# Patient Record
Sex: FEMALE | Race: BLACK OR AFRICAN AMERICAN | NOT HISPANIC OR LATINO | Employment: FULL TIME | URBAN - METROPOLITAN AREA
[De-identification: names, ages, dates, MRNs, and addresses within clinical notes are randomized per-mention and may not be internally consistent; named-entity substitution may affect disease eponyms.]

---

## 2021-05-14 PROBLEM — E66.01 SEVERE OBESITY (BMI >= 40): Status: ACTIVE | Noted: 2021-05-14

## 2021-05-14 PROBLEM — I10 ESSENTIAL HYPERTENSION: Status: ACTIVE | Noted: 2021-05-14

## 2021-05-14 PROBLEM — I63.9 CEREBROVASCULAR ACCIDENT (CVA): Status: ACTIVE | Noted: 2021-05-14

## 2021-05-15 PROBLEM — E11.9 TYPE 2 DIABETES MELLITUS WITHOUT COMPLICATION, WITHOUT LONG-TERM CURRENT USE OF INSULIN: Status: ACTIVE | Noted: 2021-05-15

## 2021-05-15 PROBLEM — G43.009 ATYPICAL MIGRAINE: Status: ACTIVE | Noted: 2021-05-15

## 2021-05-15 PROBLEM — G47.34 NOCTURNAL HYPOXEMIA: Status: ACTIVE | Noted: 2021-05-15

## 2021-05-15 PROBLEM — R53.1 LEFT-SIDED WEAKNESS: Status: ACTIVE | Noted: 2021-05-14

## 2021-05-15 PROBLEM — Z92.82 STATUS POST ADMINISTRATION OF TPA (RTPA) IN A DIFFERENT FACILITY WITHIN THE LAST 24 HOURS PRIOR TO ADMISSION TO CURRENT FACILITY: Status: ACTIVE | Noted: 2021-05-15

## 2021-05-15 PROBLEM — R56.9 NEW ONSET SEIZURE: Status: ACTIVE | Noted: 2021-05-15

## 2021-05-16 PROBLEM — G83.84 POST-ICTAL HEMIPLEGIA: Status: ACTIVE | Noted: 2021-05-14

## 2021-05-18 ENCOUNTER — TELEPHONE (OUTPATIENT)
Dept: NEUROLOGY | Facility: CLINIC | Age: 36
End: 2021-05-18

## 2021-06-03 ENCOUNTER — OFFICE VISIT (OUTPATIENT)
Dept: NEUROLOGY | Facility: CLINIC | Age: 36
End: 2021-06-03
Payer: MEDICAID

## 2021-06-03 VITALS
WEIGHT: 260.81 LBS | HEIGHT: 63 IN | HEART RATE: 79 BPM | SYSTOLIC BLOOD PRESSURE: 138 MMHG | DIASTOLIC BLOOD PRESSURE: 90 MMHG | BODY MASS INDEX: 46.21 KG/M2 | TEMPERATURE: 97 F

## 2021-06-03 DIAGNOSIS — I10 ESSENTIAL HYPERTENSION: ICD-10-CM

## 2021-06-03 DIAGNOSIS — E66.01 SEVERE OBESITY (BMI >= 40): ICD-10-CM

## 2021-06-03 DIAGNOSIS — R56.9 NEW ONSET SEIZURE: ICD-10-CM

## 2021-06-03 DIAGNOSIS — G83.84: ICD-10-CM

## 2021-06-03 PROCEDURE — 99999 PR PBB SHADOW E&M-EST. PATIENT-LVL III: ICD-10-PCS | Mod: PBBFAC,,, | Performed by: NURSE PRACTITIONER

## 2021-06-03 PROCEDURE — 99215 PR OFFICE/OUTPT VISIT, EST, LEVL V, 40-54 MIN: ICD-10-PCS | Mod: S$PBB,,, | Performed by: NURSE PRACTITIONER

## 2021-06-03 PROCEDURE — 99999 PR PBB SHADOW E&M-EST. PATIENT-LVL III: CPT | Mod: PBBFAC,,, | Performed by: NURSE PRACTITIONER

## 2021-06-03 PROCEDURE — 99213 OFFICE O/P EST LOW 20 MIN: CPT | Mod: PBBFAC,PN | Performed by: NURSE PRACTITIONER

## 2021-06-03 PROCEDURE — 99215 OFFICE O/P EST HI 40 MIN: CPT | Mod: S$PBB,,, | Performed by: NURSE PRACTITIONER

## 2021-06-03 RX ORDER — AMOXICILLIN AND CLAVULANATE POTASSIUM 875; 125 MG/1; MG/1
1 TABLET, FILM COATED ORAL 2 TIMES DAILY
COMMUNITY
Start: 2021-05-31 | End: 2022-03-09 | Stop reason: ALTCHOICE

## 2021-06-03 RX ORDER — ONDANSETRON 4 MG/1
TABLET, ORALLY DISINTEGRATING ORAL
COMMUNITY
Start: 2021-05-31

## 2021-07-07 DIAGNOSIS — R56.9 NEW ONSET SEIZURE: Primary | ICD-10-CM

## 2021-07-07 RX ORDER — TOPIRAMATE 50 MG/1
50 TABLET, FILM COATED ORAL 2 TIMES DAILY
Qty: 60 TABLET | Refills: 11 | Status: SHIPPED | OUTPATIENT
Start: 2021-07-07 | End: 2022-07-07

## 2021-07-20 ENCOUNTER — TELEPHONE (OUTPATIENT)
Dept: NEUROLOGY | Facility: CLINIC | Age: 36
End: 2021-07-20

## 2021-11-08 ENCOUNTER — TELEPHONE (OUTPATIENT)
Dept: NEUROLOGY | Facility: CLINIC | Age: 36
End: 2021-11-08
Payer: MEDICAID

## 2021-12-03 ENCOUNTER — TELEPHONE (OUTPATIENT)
Dept: NEUROLOGY | Facility: CLINIC | Age: 36
End: 2021-12-03
Payer: MEDICAID

## 2021-12-06 ENCOUNTER — OFFICE VISIT (OUTPATIENT)
Dept: NEUROLOGY | Facility: CLINIC | Age: 36
End: 2021-12-06
Payer: MEDICAID

## 2021-12-06 VITALS
SYSTOLIC BLOOD PRESSURE: 131 MMHG | HEART RATE: 84 BPM | DIASTOLIC BLOOD PRESSURE: 88 MMHG | RESPIRATION RATE: 16 BRPM | TEMPERATURE: 98 F | BODY MASS INDEX: 38.99 KG/M2 | WEIGHT: 220.13 LBS

## 2021-12-06 DIAGNOSIS — R56.9 SEIZURE: Primary | ICD-10-CM

## 2021-12-06 DIAGNOSIS — E66.9 OBESITY (BMI 30-39.9): ICD-10-CM

## 2021-12-06 DIAGNOSIS — G43.009 ATYPICAL MIGRAINE: ICD-10-CM

## 2021-12-06 DIAGNOSIS — E11.9 TYPE 2 DIABETES MELLITUS WITHOUT COMPLICATION, WITHOUT LONG-TERM CURRENT USE OF INSULIN: ICD-10-CM

## 2021-12-06 DIAGNOSIS — I10 ESSENTIAL HYPERTENSION: ICD-10-CM

## 2021-12-06 PROBLEM — G83.84 POST-ICTAL HEMIPLEGIA: Status: RESOLVED | Noted: 2021-05-14 | Resolved: 2021-12-06

## 2021-12-06 PROCEDURE — 99999 PR PBB SHADOW E&M-EST. PATIENT-LVL IV: ICD-10-PCS | Mod: PBBFAC,,, | Performed by: NURSE PRACTITIONER

## 2021-12-06 PROCEDURE — 99999 PR PBB SHADOW E&M-EST. PATIENT-LVL IV: CPT | Mod: PBBFAC,,, | Performed by: NURSE PRACTITIONER

## 2021-12-06 PROCEDURE — 99214 PR OFFICE/OUTPT VISIT, EST, LEVL IV, 30-39 MIN: ICD-10-PCS | Mod: S$PBB,,, | Performed by: NURSE PRACTITIONER

## 2021-12-06 PROCEDURE — 99214 OFFICE O/P EST MOD 30 MIN: CPT | Mod: PBBFAC,PO | Performed by: NURSE PRACTITIONER

## 2021-12-06 PROCEDURE — 99214 OFFICE O/P EST MOD 30 MIN: CPT | Mod: S$PBB,,, | Performed by: NURSE PRACTITIONER

## 2021-12-06 RX ORDER — LANOLIN ALCOHOL/MO/W.PET/CERES
400 CREAM (GRAM) TOPICAL DAILY
COMMUNITY

## 2022-02-25 ENCOUNTER — TELEPHONE (OUTPATIENT)
Dept: NEUROLOGY | Facility: CLINIC | Age: 37
End: 2022-02-25
Payer: MEDICAID

## 2022-02-25 NOTE — TELEPHONE ENCOUNTER
Call returned to Pt in ref to medication.Pt states she would like to lower the dose of her Topamax in order to wean off of it. C/O of it causing headaches back to back. Message forwarded to ARIAS Jaeger to advise.

## 2022-02-25 NOTE — TELEPHONE ENCOUNTER
Call placed to Pt to advise that per LENKA Ward, D/t having been on topiramate for a while, she feels It is very unlikely that this is what is causing her to have a headache. Pt asked if she would  like to be seen in migraine clinic given her history of migraines and worsening? Pt states that she would like to be seen.

## 2022-02-25 NOTE — TELEPHONE ENCOUNTER
----- Message from Padmini Valdez sent at 2/25/2022  9:16 AM CST -----  Contact: patient  Type: Needs Medical Advice  Who Called:  patient   Symptoms (please be specific):  headaches back to back  How long has patient had these symptoms:    Pharmacy name and phone #:    Best Call Back Number: [phone] (home)     Additional Information: requesting a return call concerning clearing patient off of medication.

## 2022-03-09 ENCOUNTER — OFFICE VISIT (OUTPATIENT)
Dept: NEUROLOGY | Facility: CLINIC | Age: 37
End: 2022-03-09
Payer: MEDICAID

## 2022-03-09 VITALS
SYSTOLIC BLOOD PRESSURE: 129 MMHG | DIASTOLIC BLOOD PRESSURE: 83 MMHG | RESPIRATION RATE: 17 BRPM | HEART RATE: 74 BPM | BODY MASS INDEX: 37.64 KG/M2 | HEIGHT: 63 IN | WEIGHT: 212.44 LBS | TEMPERATURE: 97 F

## 2022-03-09 DIAGNOSIS — M79.18 MYOFASCIAL PAIN: ICD-10-CM

## 2022-03-09 DIAGNOSIS — G44.86 CERVICOGENIC HEADACHE: Primary | ICD-10-CM

## 2022-03-09 DIAGNOSIS — G47.26 SHIFT WORK SLEEP DISORDER: ICD-10-CM

## 2022-03-09 DIAGNOSIS — G43.009 MIGRAINE WITHOUT AURA AND WITHOUT STATUS MIGRAINOSUS, NOT INTRACTABLE: ICD-10-CM

## 2022-03-09 PROCEDURE — 1159F MED LIST DOCD IN RCRD: CPT | Mod: CPTII,,, | Performed by: PHYSICIAN ASSISTANT

## 2022-03-09 PROCEDURE — 99999 PR PBB SHADOW E&M-EST. PATIENT-LVL V: CPT | Mod: PBBFAC,,, | Performed by: PHYSICIAN ASSISTANT

## 2022-03-09 PROCEDURE — 3079F PR MOST RECENT DIASTOLIC BLOOD PRESSURE 80-89 MM HG: ICD-10-PCS | Mod: CPTII,,, | Performed by: PHYSICIAN ASSISTANT

## 2022-03-09 PROCEDURE — 3079F DIAST BP 80-89 MM HG: CPT | Mod: CPTII,,, | Performed by: PHYSICIAN ASSISTANT

## 2022-03-09 PROCEDURE — 3074F SYST BP LT 130 MM HG: CPT | Mod: CPTII,,, | Performed by: PHYSICIAN ASSISTANT

## 2022-03-09 PROCEDURE — 1160F RVW MEDS BY RX/DR IN RCRD: CPT | Mod: CPTII,,, | Performed by: PHYSICIAN ASSISTANT

## 2022-03-09 PROCEDURE — 99215 PR OFFICE/OUTPT VISIT, EST, LEVL V, 40-54 MIN: ICD-10-PCS | Mod: S$PBB,,, | Performed by: PHYSICIAN ASSISTANT

## 2022-03-09 PROCEDURE — 99215 OFFICE O/P EST HI 40 MIN: CPT | Mod: S$PBB,,, | Performed by: PHYSICIAN ASSISTANT

## 2022-03-09 PROCEDURE — 3008F BODY MASS INDEX DOCD: CPT | Mod: CPTII,,, | Performed by: PHYSICIAN ASSISTANT

## 2022-03-09 PROCEDURE — 3008F PR BODY MASS INDEX (BMI) DOCUMENTED: ICD-10-PCS | Mod: CPTII,,, | Performed by: PHYSICIAN ASSISTANT

## 2022-03-09 PROCEDURE — 1159F PR MEDICATION LIST DOCUMENTED IN MEDICAL RECORD: ICD-10-PCS | Mod: CPTII,,, | Performed by: PHYSICIAN ASSISTANT

## 2022-03-09 PROCEDURE — 99999 PR PBB SHADOW E&M-EST. PATIENT-LVL V: ICD-10-PCS | Mod: PBBFAC,,, | Performed by: PHYSICIAN ASSISTANT

## 2022-03-09 PROCEDURE — 99215 OFFICE O/P EST HI 40 MIN: CPT | Mod: PBBFAC,PO | Performed by: PHYSICIAN ASSISTANT

## 2022-03-09 PROCEDURE — 1160F PR REVIEW ALL MEDS BY PRESCRIBER/CLIN PHARMACIST DOCUMENTED: ICD-10-PCS | Mod: CPTII,,, | Performed by: PHYSICIAN ASSISTANT

## 2022-03-09 PROCEDURE — 3074F PR MOST RECENT SYSTOLIC BLOOD PRESSURE < 130 MM HG: ICD-10-PCS | Mod: CPTII,,, | Performed by: PHYSICIAN ASSISTANT

## 2022-03-09 RX ORDER — ASCORBIC ACID, CHOLECALCIFEROL, .ALPHA.-TOCOPHEROL ACETATE, DL-, PYRIDOXINE HYDROCHLORIDE, FOLIC ACID, CYANOCOBALAMIN, BIOTIN, CALCIUM CARBONATE, FERROUS ASPARTO GLYCINATE, IRON, POTASSIUM IODIDE, MAGNESIUM OXIDE, DOCONEXENT AND LOWBUSH BLUEBERRY 60; 1000; 10; 26; 400; 13; 280; 80; 9; 9; 150; 25; 350; 25; 600 MG/1; [IU]/1; [IU]/1; MG/1; UG/1; UG/1; UG/1; MG/1; MG/1; MG/1; UG/1; MG/1; MG/1; MG/1; UG/1
1 CAPSULE, GELATIN COATED ORAL DAILY
COMMUNITY
Start: 2021-12-15

## 2022-03-09 RX ORDER — RIZATRIPTAN BENZOATE 10 MG/1
TABLET, ORALLY DISINTEGRATING ORAL
Qty: 9 TABLET | Refills: 4 | Status: SHIPPED | OUTPATIENT
Start: 2022-03-09

## 2022-03-09 RX ORDER — CHLORZOXAZONE 500 MG/1
500 TABLET ORAL 3 TIMES DAILY PRN
Qty: 90 TABLET | Refills: 4 | Status: SHIPPED | OUTPATIENT
Start: 2022-03-09 | End: 2022-03-19

## 2022-03-09 NOTE — PROGRESS NOTES
"  [facility]  [address] [facility] [address]  [place] [address]  Phone:[phone]  Fax: [phone]   New Patient Consultation (though seen by ARIAS Thomason in past)    Patient Name: Dary Pereira  : 1985  MRN:  [id number]  Today: 3/9/2022   chief complaint: Headache    PCP: PAT Leggett.    Assessment:   Dary Pereira is a 36 y.o. right handed, female with a PMHx of: migraine (at times "complicated migraine"), seizure (negative EEG findings), HTN and DM2  whom presents solo to discuss HA. HA appear to be migrainous with cervicogenic/myofascial component. Lack of sleep (apparent shift work disorder, as she states "I was better when I worked days") possibly contribute.       Review:    ICD-10-CM ICD-9-CM   1. Cervicogenic headache  G44.86 784.0   2. Migraine without aura and without status migrainosus, not intractable  G43.009 346.10   3. Shift work sleep disorder  G47.26 327.36   4. Myofascial pain  M79.18 729.1         Plan:   Discussed realistic goals of care with patient at length. Discussed medication options, need for lifestyle adjustment. Discussed treatment will take time. Goal will be to reduce frequency/intensity/quantity of HA, not to be completely HA free. Gave copy of S triggers for migraine informational sheet, and discussed clinic's non narcotic policy re: HA. Patient voiced understanding and agreement.            -will have patient work on lifestyle           -if HA change in quality/nature, will get updated imaging studies             -discussed potential for teratogenicity with treatment, if her family planning status should change, discussed I'd like her to let office know immediately. She voice agreement    For HA Prevention:  1 did not want to adjust topamax, also states she doesn't want any medicine that will make her tired in case she needs to be up during the day  2 difficult for her to get PT d/t sleeping during " "day   3 try parafon forte (non sedating muscle relaxant) 1 pill TID for 2-3 weeks, then switch to 1 pill TID PRN HA/myofascial pain, no use with etoh, if it makes her tired/just cut in half, she agreed to try     For HA :  1 wrote for maxalt MLT, discussed adv effects/dosing, she agreed         -we did discuss about potential concerns with stroke with this medicine, she voiced agreement and willingness to try       To break up Headaches:  We can consider nerve blocks in future     Other:  Talk to PCP about stress/possibly consider mood medication           All test results as well as any necessary instructions were reviewed and discussed with patient.    Review:  Orders Placed This Encounter    chlorzoxazone (PARAFON FORTE) 500 mg Tab    rizatriptan (MAXALT-MLT) 10 MG disintegrating tablet         Patient to return to PCP/other specialists for all other problems  Patient to continue on all medications as Rx'd   A detailed AVS was provided to the patient with patient readback   RTO- 6 weeks to check in   The patient indicates understanding of these issues and agrees to the plan.    HPI:   Dary Pereira is a 36 y.o.right handed, female with a PMHx of: migraine (at times "complicated migraine"), seizure (negative EEG findings), HTN and DM2  whom presents solo to discuss HA.        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    Tried:OTC medicines, on topamax for years at 50 mg BID   Triggers (in bold): stress, lack of sleep (sleeps during day, but this is interrupted at times), too much caffeine, too little caffeine, weather change, seasonal change, strong odours, bright lights, sunlight, food   Currently having a HA?:yes   Positives in bold: Hx of Kidney Stones, asthma, GI bleed, osteoporosis, CAD/MI, CVA/TIA, DM    Imaging on file: MRI Brain, MRA head/Neck (all 2021): unremarkable for acute/chornic concern  Therapies tried in past: (failures to be marked, if known---why did it fail?)  norvasc  topamax  MgOx  PT  zofran     Medication Reconciliation:   Current Outpatient Medications   Medication Sig Dispense Refill    amLODIPine (NORVASC) 10 MG tablet Take 1 tablet (10 mg total) by mouth once daily. 90 tablet 0    amoxicillin-clavulanate 875-125mg (AUGMENTIN) 875-125 mg per tablet Take 1 tablet by mouth 2 (two) times daily.      blood sugar diagnostic Strp To check BG 1-2 times daily, to use with insurance preferred meter 50 each 1    blood-glucose meter kit To check BG 1-2 times daily, to use with insurance preferred meter 1 each 1    lancets Misc To check BG 1-2 times daily, to use with insurance preferred meter 50 each 1    levonorgestreL (MIRENA) 20 mcg/24 hours (6 yrs) 52 mg IUD 1 each by Intrauterine route once.      magnesium oxide (MAG-OX) 400 mg (241.3 mg magnesium) tablet Take 400 mg by mouth once daily. At Hs      metFORMIN (GLUCOPHAGE) 500 MG tablet Take 1 tablet (500 mg total) by mouth 2 (two) times daily with meals. 180 tablet 0    ondansetron (ZOFRAN-ODT) 4 MG TbDL       topiramate (TOPAMAX) 50 MG tablet Take 1 tablet (50 mg total) by mouth 2 (two) times daily. 60 tablet 11     No current facility-administered medications for this visit.     Review of patient's allergies indicates:  No Known  Allergies    PMHx:  Past Medical History:   Diagnosis Date    Bronchitis     Hypertension     Migraine headache     Post-ictal hemiplegia 2021     Past Surgical History:   Procedure Laterality Date    LOOP ELECTROSURGICAL EXCISION PROCEDURE (LEEP)         Fhx:maternal aunt with headaches   Family History   Problem Relation Age of Onset    Migraines Maternal Aunt        Shx:   Social History     Socioeconomic History    Marital status: Single   Tobacco Use    Smoking status: Never Smoker    Smokeless tobacco: Never Used   Substance and Sexual Activity    Alcohol use: Yes     Comment: occas    Drug use: No    Sexual activity: Yes     Birth control/protection: I.U.D.           Labs:   Reviewed in chart     Imagin2021 MRI Brain (report): FINDINGS:  INTRACRANIAL: Brain parenchyma demonstrates normal signal and configuration.  Left basal ganglia prominent perivascular space is noted.  No parenchymal restricted diffusion.  No evidence of intracranial hemorrhage.  No extra-axial fluid collection or mass.  No intracranial mass effect.  No hydrocephalus.  Midline structures have a normal configuration.  Visualized pituitary gland and infundibulum are normal.  Visualized major intracranial vascular structures demonstrate normal flow voids and are normal in course and caliber.     SINUSES: Paranasal sinuses and mastoid air cells are clear.     ORBITS: Visualized orbits are normal.     OTHER: Moderate uniform enlargement of the posterior nasopharyngeal tissue/adenoids.     Impression:     No acute intracranial abnormality    2021 MRA brain (report):    No occlusion, hemodynamically significant stenosis or aneurysm.       5/15/2021 MRA Neck (report): Impression:     Nighthawk concordant.  No occlusion, hemodynamically significant stenosis or dissection.       Other testin/15/2021 EEG (report): This is a normal EEG in an awake and drowsy adult.  Please be aware that a normal EEG does not  "exclude the possibility of an underlying seizure disorder.     Note: I have independently reviewed any/all imaging/labs/tests and agree with the report (s) as documented.  Any discrepancies will be as noted/demarcated by free text.  RODNEY ROSE 3/9/2022                     ROS:   Review Of Systems (questions asked, positive or additions in BOLD)  Gen: Weight change, fatigue/malaise, pyrexia   HEENT: Tinnitus, headache,  blurred vision, eye pain, diplopia, photophobia,  nose bleeds, congestion, sore throat, jaw pain, scalp pain, neck stiffness   Card: Palpitations, CP   Pulm: SOB, cough   Vas: Easy bruising, easy bleeding   GI: N/V/D/C, incontinence, hematemesis, hematochezia    : incontinence, hematuria   Endocrine: Temp intolerance, polyuria, polydipsia   M/S: Neck pain, myalgia, back pain, joint pain, falls    Neuro: PER HPI   PSY: Memory loss, confusion, depression, anxiety, trouble in sleep          EXAM:   /83 (BP Location: Right arm, Patient Position: Sitting, BP Method: Large (Automatic))   Pulse 74   Temp 97.1 °F (36.2 °C) (Temporal)   Resp 17   Ht 5' 3" (1.6 m)   Wt 96.3 kg (212 lb 6.6 oz)   BMI 37.63 kg/m²    GEN:  NAD, though hand wringing witnessed,   HEENT: NC/AT, Frontalis was NTTP, temporalis was NTTP,  nares patent, dentition appropriate,   neck supple, trachea midline, Occiput and trapezius  TTP  EXTREM:  no edema present.    NEURO:  Mental Status:  Awake, alert and appropriately oriented to time, place, and person.  Normal attention and concentration.  Speech is fluent and appropriate language function (I.e., comprehension)    Cranial Nerves:    Extraocular movements are intact and without nystagmus.  Visual fields are full to confrontation testing. Colour vision change not found.  Facial movement is symmetric.  Facial sensation is intact.  Hearing is normal. Uvula in midline. DROM of neck in all (6) directions, shoulder shrug symmetrical. Tongue in midline without " fasiculation.     Motor:  RUE:appropriate against gravity and medium force as tested 5/5              LUE: appropriate against gravity and medium force as tested 5/5              RLE:appropriate against gravity and medium force as tested 5/5              LLE: appropriate against gravity and medium force as tested 5/5  Tremor/pronator drift not apparent.     finger extension strength was strong bilat     Sensory:  RUE  intact light touch, proprioception and temperature  LUE intact light touch, proprioception and temperature    RLE intact light touch  LLE intact light touch      DTR's:                                            R              L  biceps 2+ 2+         brachioradialis 2+ 2+   Knee jerk 2+ 2+      Coordination:  FTN-WNL.       Gait and Stance: Normal manner of stance and gait function testing. Romberg was negative          This document has been electronically signed by Mr. Pk Lopez MPA, PA-C on 3/9/2022, I have personally typed this message using the EMR.       Dr Randall MD  was available during today's visit.     Personal Protective Equipment:    Personal Protective Equipment was used during this encounter including: KN95 and non latex gloves.          CC: PAT Leggett, ARIAS Thomason

## 2022-03-09 NOTE — PATIENT INSTRUCTIONS
To reduce headaches:        1) continue your topamax at 50 mg twice a day        2) try the parafon forte (choloroxazone) 1 pill up to 3x a day as needed for neck pain and headaches, try daily for a few weeks then switch to as needed         3) we can consider PT in the future      To abort a bad headache:  Try the maxalt (rizatriptan) melt, 1 at onset headache, second melt 2 hours later if still needed, no more than 2 in a day, 3 days use in week

## 2022-04-19 ENCOUNTER — TELEPHONE (OUTPATIENT)
Dept: NEUROLOGY | Facility: CLINIC | Age: 37
End: 2022-04-19
Payer: MEDICAID

## 2022-04-20 ENCOUNTER — OFFICE VISIT (OUTPATIENT)
Dept: NEUROLOGY | Facility: CLINIC | Age: 37
End: 2022-04-20
Payer: MEDICAID

## 2022-04-20 VITALS
RESPIRATION RATE: 17 BRPM | HEART RATE: 80 BPM | SYSTOLIC BLOOD PRESSURE: 148 MMHG | TEMPERATURE: 99 F | WEIGHT: 210.19 LBS | DIASTOLIC BLOOD PRESSURE: 88 MMHG | HEIGHT: 63 IN | BODY MASS INDEX: 37.24 KG/M2

## 2022-04-20 DIAGNOSIS — G44.86 CERVICOGENIC HEADACHE: Primary | ICD-10-CM

## 2022-04-20 PROCEDURE — 3077F SYST BP >= 140 MM HG: CPT | Mod: CPTII,,, | Performed by: PHYSICIAN ASSISTANT

## 2022-04-20 PROCEDURE — 99214 OFFICE O/P EST MOD 30 MIN: CPT | Mod: PBBFAC,PO | Performed by: PHYSICIAN ASSISTANT

## 2022-04-20 PROCEDURE — 3079F PR MOST RECENT DIASTOLIC BLOOD PRESSURE 80-89 MM HG: ICD-10-PCS | Mod: CPTII,,, | Performed by: PHYSICIAN ASSISTANT

## 2022-04-20 PROCEDURE — 3008F PR BODY MASS INDEX (BMI) DOCUMENTED: ICD-10-PCS | Mod: CPTII,,, | Performed by: PHYSICIAN ASSISTANT

## 2022-04-20 PROCEDURE — 99214 PR OFFICE/OUTPT VISIT, EST, LEVL IV, 30-39 MIN: ICD-10-PCS | Mod: S$PBB,,, | Performed by: PHYSICIAN ASSISTANT

## 2022-04-20 PROCEDURE — 3079F DIAST BP 80-89 MM HG: CPT | Mod: CPTII,,, | Performed by: PHYSICIAN ASSISTANT

## 2022-04-20 PROCEDURE — 1159F MED LIST DOCD IN RCRD: CPT | Mod: CPTII,,, | Performed by: PHYSICIAN ASSISTANT

## 2022-04-20 PROCEDURE — 1160F RVW MEDS BY RX/DR IN RCRD: CPT | Mod: CPTII,,, | Performed by: PHYSICIAN ASSISTANT

## 2022-04-20 PROCEDURE — 99999 PR PBB SHADOW E&M-EST. PATIENT-LVL IV: ICD-10-PCS | Mod: PBBFAC,,, | Performed by: PHYSICIAN ASSISTANT

## 2022-04-20 PROCEDURE — 99214 OFFICE O/P EST MOD 30 MIN: CPT | Mod: S$PBB,,, | Performed by: PHYSICIAN ASSISTANT

## 2022-04-20 PROCEDURE — 3008F BODY MASS INDEX DOCD: CPT | Mod: CPTII,,, | Performed by: PHYSICIAN ASSISTANT

## 2022-04-20 PROCEDURE — 99999 PR PBB SHADOW E&M-EST. PATIENT-LVL IV: CPT | Mod: PBBFAC,,, | Performed by: PHYSICIAN ASSISTANT

## 2022-04-20 PROCEDURE — 1160F PR REVIEW ALL MEDS BY PRESCRIBER/CLIN PHARMACIST DOCUMENTED: ICD-10-PCS | Mod: CPTII,,, | Performed by: PHYSICIAN ASSISTANT

## 2022-04-20 PROCEDURE — 3077F PR MOST RECENT SYSTOLIC BLOOD PRESSURE >= 140 MM HG: ICD-10-PCS | Mod: CPTII,,, | Performed by: PHYSICIAN ASSISTANT

## 2022-04-20 PROCEDURE — 1159F PR MEDICATION LIST DOCUMENTED IN MEDICAL RECORD: ICD-10-PCS | Mod: CPTII,,, | Performed by: PHYSICIAN ASSISTANT

## 2022-04-20 NOTE — PROGRESS NOTES
"  [facility]  [facility]  [address]  [place] [address]  Phone:[phone]  Fax: [phone]  Follow up visit   Patient Name: Dary Pereira  : 1985  MRN:  [id number]   LOV: 3/9/2022  Today: 2022   chief complaint: Headache    PCP: PAT Leggett.    Assessment:   Dary Pereira is a 36 y.o. right handed, female with a PMHx of: migraine (at times "complicated migraine"), seizure (negative EEG findings), HTN and DM2  whom presents with fiance to discuss HA. HA appear to be migrainous with cervicogenic/myofascial component (histroically) however notes complete resolve.     Review:    ICD-10-CM ICD-9-CM   1. Cervicogenic headache  G44.86 784.0         Plan:               -if HA change in quality/nature, will get updated imaging studies            For HA Prevention:  1 continue topamax, on for "seizure" and HA, will need follow up with ARIAS Thomason for seizures later this year   2 has parafon forte available     For HA :  maxalt available     To break up Headaches:  We can consider nerve blocks in future     Other:  N/a        All test results as well as any necessary instructions were reviewed and discussed with patient.    Review:         Patient to return to PCP/other specialists for all other problems  Patient to continue on all medications as Rx'd     RTO- PRN with me   The patient indicates understanding of these issues and agrees to the plan.    HPI:   Dary Pereira is a 36 y.o.right handed, female with a PMHx of: migraine (at times "complicated migraine"), seizure (negative EEG findings), HTN and DM2  whom presents with her fiancein follow up for HA.     At last visit: parafon forte written, and maxalt ordered.   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    Tried:OTC medicines, on topamax for years at 50 mg BID   Triggers (in bold): stress, lack of sleep (sleeps during day, but this is interrupted at times), too much caffeine, too little caffeine, weather change, seasonal change, strong odours, bright lights, sunlight, food   Currently having a HA?:yes   Positives in bold: Hx of Kidney Stones, asthma, GI bleed, osteoporosis, CAD/MI, CVA/TIA, DM    Imaging on file: MRI Brain, MRA head/Neck (all 2021): unremarkable for acute/chornic concern  Therapies tried in past: (failures to be marked, if known---why did it fail?)  norvasc  topamax  MgOx  PT  zofran     Medication Reconciliation:   Current Outpatient Medications   Medication Sig Dispense Refill    amLODIPine (NORVASC) 10 MG tablet Take 1 tablet (10 mg total) by mouth once daily. 90 tablet 0    blood sugar diagnostic Strp To check BG 1-2 times daily, to use with insurance preferred meter 50 each 1    blood-glucose meter kit To check BG 1-2 times daily, to use with insurance preferred meter 1 each 1    lancets Misc To check BG 1-2 times daily, to use with insurance preferred meter 50 each 1    levonorgestreL (MIRENA) 20 mcg/24 hours (6 yrs) 52 mg IUD 1 each " by Intrauterine route once.      magnesium oxide (MAG-OX) 400 mg (241.3 mg magnesium) tablet Take 400 mg by mouth once daily. At Hs      metFORMIN (GLUCOPHAGE) 500 MG tablet Take 1 tablet (500 mg total) by mouth 2 (two) times daily with meals. 180 tablet 0    ondansetron (ZOFRAN-ODT) 4 MG TbDL       PRENATE MINI, FERR ASP GLYCIN, 18-1-350 mg Cap Take 1 capsule by mouth once daily.      rizatriptan (MAXALT-MLT) 10 MG disintegrating tablet 1 melt at onset headache, second melt 2 hours later if needed, no more than 2 melts day/3 days use in week 9 tablet 4    topiramate (TOPAMAX) 50 MG tablet Take 1 tablet (50 mg total) by mouth 2 (two) times daily. 60 tablet 11     No current facility-administered medications for this visit.     Review of patient's allergies indicates:  No Known Allergies    PMHx:  Past Medical History:   Diagnosis Date    Bronchitis     Hypertension     Migraine headache     Post-ictal hemiplegia 2021     Past Surgical History:   Procedure Laterality Date    LOOP ELECTROSURGICAL EXCISION PROCEDURE (LEEP)         Fhx:maternal aunt with headaches   Family History   Problem Relation Age of Onset    Migraines Maternal Aunt        Shx:   Social History     Socioeconomic History    Marital status: Single   Tobacco Use    Smoking status: Never Smoker    Smokeless tobacco: Never Used   Substance and Sexual Activity    Alcohol use: Yes     Comment: occas    Drug use: No    Sexual activity: Yes     Birth control/protection: I.U.D.           Labs:   Reviewed in chart     Imagin2021 MRI Brain (report): FINDINGS:  INTRACRANIAL: Brain parenchyma demonstrates normal signal and configuration.  Left basal ganglia prominent perivascular space is noted.  No parenchymal restricted diffusion.  No evidence of intracranial hemorrhage.  No extra-axial fluid collection or mass.  No intracranial mass effect.  No hydrocephalus.  Midline structures have a normal configuration.  Visualized  "pituitary gland and infundibulum are normal.  Visualized major intracranial vascular structures demonstrate normal flow voids and are normal in course and caliber.     SINUSES: Paranasal sinuses and mastoid air cells are clear.     ORBITS: Visualized orbits are normal.     OTHER: Moderate uniform enlargement of the posterior nasopharyngeal tissue/adenoids.     Impression:     No acute intracranial abnormality    2021 MRA brain (report):    No occlusion, hemodynamically significant stenosis or aneurysm.       5/15/2021 MRA Neck (report): Impression:     Nighthawk concordant.  No occlusion, hemodynamically significant stenosis or dissection.       Other testin/15/2021 EEG (report): This is a normal EEG in an awake and drowsy adult.  Please be aware that a normal EEG does not exclude the possibility of an underlying seizure disorder.     Note: I have independently reviewed any/all imaging/labs/tests and agree with the report (s) as documented.  Any discrepancies will be as noted/demarcated by free text.  RODNEY ROSE 2022                     ROS:   Review Of Systems (questions asked, positive or additions in BOLD)  Gen: Weight change, fatigue/malaise, pyrexia   HEENT: Tinnitus, headache,  blurred vision, eye pain, diplopia, photophobia,  nose bleeds, congestion, sore throat, jaw pain, scalp pain, neck stiffness   Card: Palpitations, CP   Pulm: SOB, cough   Vas: Easy bruising, easy bleeding   GI: N/V/D/C, incontinence, hematemesis, hematochezia    : incontinence, hematuria   Endocrine: Temp intolerance, polyuria, polydipsia   M/S: Neck pain, myalgia, back pain, joint pain, falls    Neuro: PER HPI   PSY: Memory loss, confusion, depression, anxiety, trouble in sleep          EXAM:   BP (!) 148/88 (BP Location: Left arm, Patient Position: Sitting, BP Method: Large (Automatic))   Pulse 80   Temp 98.6 °F (37 °C) (Temporal)   Resp 17   Ht 5' 3" (1.6 m)   Wt 95.4 kg (210 lb 3.3 oz)   BMI 37.24 kg/m²    GEN:  " NAD   HEENT: NC/AT  EXTREM:  no edema present.    NEURO:  Mental Status:  Awake, alert and appropriately oriented to time, place, and person.  Normal attention and concentration.  Speech is fluent and appropriate language function (I.e., comprehension)    Cranial Nerves:    Extraocular movements are intact and without nystagmus.  Visual fields are full to confrontation testing.   Facial movement is symmetric above the maskline.     Hearing is normal.  . DROM of neck in all (6) directions, shoulder shrug symmetrical.      Motor:  Antigravity in all limbs  Tremor/pronator drift not apparent.     Gait and Stance: Normal manner of stance and gait function testing.         This document has been electronically signed by  Pk VIDALESAlec Lopez MPA, PA-C on 4/20/2022, I have personally typed this message using the EMR.       Dr Randall MD  was available during today's visit.     Personal Protective Equipment:    Personal Protective Equipment was used during this encounter including: KN95 and non latex gloves.          CC: PAT Leggett, ARIAS Thomason